# Patient Record
(demographics unavailable — no encounter records)

---

## 2025-04-17 NOTE — ASSESSMENT
[FreeTextEntry1] : 52-year-old male with past medical history significant for hypertension, diabetes mellitus, obesity, hyperlipidemia, CVA, CAD s/p PCI, CHF and progressive CKD who was seen in outpatient nephrology clinic today for CKD stage III.  1.  Chronic kidney disease stage III: Etiology multifactorial with longstanding DM/HTN/renal atherosclerotic disease Serum creatinine now closer to 2-2.2 mg/deciliter on a baseline. Most recent labs obtained in ER 2 weeks ago with a serum creatinine of 2.3. Urinalysis with proteinuria, UACR 3201 mg/gram (Patient just voided prior to office visit and was not able to give sample in the office today) Discussed comorbidities and progressive nature of his CKD.  Patient verbalized understanding.  Also discussed goal of blood pressure less than 130/80, LDL less than 100 and A1c less than 7. Continue on ACE inhibitor And SGLT2i  2.  Blood pressure/volume status: Blood pressure at goal, patient euvolemic, now s/p Cardio MEMS.  Continue on current dose of Bumex 1 mg p.o. twice daily and spironolactone 50 mg p.o. daily. Medications are titrated per cardiology based on readings.  3.  CHF: Following cardiology.  4.  Diabetes mellitus: Management per endocrinology.  5.  Hyperlipidemia: Continue atorvastatin.  Reviewed recent ER visit and discussed lab findings, imaging findings with patient in detail

## 2025-04-17 NOTE — HISTORY OF PRESENT ILLNESS
[FreeTextEntry1] : Patient is 52-year-old male with past medical history significant for hypertension, diabetes mellitus, obesity, hyperlipidemia, CVA, CAD s/p PCI, CHF and progressive CKD who was seen in outpatient nephrology clinic today for CKD stage III.  Patient reports recent admission at [facility] for acute on chronic congestive heart failure decompensation, acute kidney injury.  He was placed on IV diuretics and has CardioMEMS placed.  Patient was discharged on Bumex 1 mg p.o. twice daily and spironolactone.  Serum creatinine at time of discharge was closer to 2 and on most recent labs obtained was 2.2 mg/deciliter.  Patient reports swelling in the leg has improved and he is more functional now (previously was getting short of breath while performing minimal activities)  Denies any dysuria, gross hematuria, chest pain.  No dizziness, cramps. Admits that blood sugar continues to fluctuate.  Follow endocrinology.  ===============================================================================================  4/17/2025  Patient went to ER 2 weeks ago for dysuria workup negative for UTI.  UA WNL, urine culture no growth, CT imaging reviewed.  Patient reports improvement in the symptoms. Reports change in medications per cardiology based on CardioMEMS reading. No other complaints.

## 2025-04-17 NOTE — PHYSICAL EXAM
[TextEntry] : Gen: NAD, well-appearing; obese, using cane for ambulation HEENT: Supple neck, MMM Pulm: Decreased breath sounds CV: RRR, no rub Back: No spinal or CVA tenderness Abd: +BS, soft, nontender/nondistended LE: Warm,trace edema Psych: Normal affect Skin: Warm, without rashes

## 2025-07-30 NOTE — HISTORY OF PRESENT ILLNESS
[FreeTextEntry1] : Patient is 52-year-old male with past medical history significant for hypertension, diabetes mellitus, obesity, hyperlipidemia, CVA, CAD s/p PCI, CHF and progressive CKD who was seen in outpatient nephrology clinic today for CKD stage III.  Patient reports recent admission at [facility] for acute on chronic congestive heart failure decompensation, acute kidney injury. He was placed on IV diuretics and has CardioMEMS placed. Patient was discharged on Bumex 1 mg p.o. twice daily and spironolactone.  He has ischemic heart disease and resulting cardiomyopathy and has been cath with stents.  He was placed on GDMT  TTE 7/1/25;   1. Technically difficult image quality.  2. Left ventricular cavity is normal in size. Left ventricular systolic function is low normal with an ejection fraction of 55 % by Carias's method of disks with an ejection fraction visually estimated at 50 to 55 %.  3. Basal and mid anterolateral wall, basal inferoseptal segment, basal inferolateral segment, and basal inferior segment are abnormal.  4. Normal right ventricular cavity size and normal right ventricular systolic function.  5. The left ventricular diastolic function is indeterminate.  6. Left atrium is normal in size.  7. Mild left ventricular hypertrophy.  8. No pericardial effusion seen.  9. No prior echocardiogram is available for comparison.  He had a Holter done July 2025 which demonstrated 10% PVCs and 2% PACs there were no sustained arrhythmia detected.PVCs appear monomorphic on the Holter A prior EKG captured PVCs and precordial leads as well as lead II and PVCs a right bundle with positive concordance and inferiorly directed consistent with an LVOT site of origin He is maintained on carvedilol???  [place]

## 2025-07-30 NOTE — DISCUSSION/SUMMARY
[FreeTextEntry1] :   We discussed the pathophysiology of premature ventricular beats and multiple management options with M***. @[facility]@ in detail. Indications for therapy generally include bothersome symptoms or evidence of LV dysfunction that could potentially be attributable to the frequent ectopy. The burden of ectopy necessary to cause significant LV dysfunction in an otherwise structurally normal heart is often higher than that demonstrated on ***s Holter monitor, but the history of *** could potentially exacerbate the effects of the PVC's. Unfortunately, in the absence of significant symptoms, suppression of the ventricular ectopy and monitoring for improvement in LV function is probably the only way to know for certain if they are clinically significant. Options for therapy include antiarrhythmic medications, such as sotalol or amiodarone, each with its own pros and cons. We also reviewed the potential risks and benefits of ablative therapy for ventricular premature beats, which can often be performed with a high rate of success. The overall risk of complications is 2-5%, including vascular injury and bleeding, cardiac perforation and tamponade, damage to the normal electrical conduction system necessitating pacemaker therapy, coronary artery stenosis, stroke, heart attack, and death.  [EKG obtained to assist in diagnosis and management of assessed problem(s)] : EKG obtained to assist in diagnosis and management of assessed problem(s)

## 2025-07-30 NOTE — REASON FOR VISIT
[Symptom and Test Evaluation] : symptom and test evaluation [FreeTextEntry1] : 53-year-old male referred for PVCs?